# Patient Record
Sex: MALE | Race: WHITE | NOT HISPANIC OR LATINO | Employment: FULL TIME | URBAN - METROPOLITAN AREA
[De-identification: names, ages, dates, MRNs, and addresses within clinical notes are randomized per-mention and may not be internally consistent; named-entity substitution may affect disease eponyms.]

---

## 2022-01-07 ENCOUNTER — LAB VISIT (OUTPATIENT)
Dept: PRIMARY CARE CLINIC | Facility: CLINIC | Age: 22
End: 2022-01-07
Payer: OTHER GOVERNMENT

## 2022-01-07 DIAGNOSIS — Z20.822 CONTACT WITH AND (SUSPECTED) EXPOSURE TO COVID-19: ICD-10-CM

## 2022-01-07 LAB
CTP QC/QA: YES
SARS-COV-2 AG RESP QL IA.RAPID: NEGATIVE

## 2022-01-07 PROCEDURE — 87811 SARS-COV-2 COVID19 W/OPTIC: CPT

## 2022-03-18 ENCOUNTER — CLINICAL SUPPORT (OUTPATIENT)
Dept: OTHER | Facility: CLINIC | Age: 22
End: 2022-03-18
Payer: OTHER GOVERNMENT

## 2022-03-18 DIAGNOSIS — Z00.8 ENCOUNTER FOR OTHER GENERAL EXAMINATION: ICD-10-CM

## 2022-03-19 LAB
GLUCOSE SERPL-MCNC: 109 MG/DL (ref 60–140)
HDLC SERPL-MCNC: 33 MG/DL
POC CHOLESTEROL, LDL (DOCK): 82.6 MG/DL
POC CHOLESTEROL, TOTAL: 137 MG/DL
TRIGL SERPL-MCNC: 107 MG/DL

## 2022-07-24 ENCOUNTER — OFFICE VISIT (OUTPATIENT)
Dept: URGENT CARE | Facility: CLINIC | Age: 22
End: 2022-07-24
Payer: COMMERCIAL

## 2022-07-24 VITALS
OXYGEN SATURATION: 96 % | BODY MASS INDEX: 25.99 KG/M2 | HEART RATE: 74 BPM | TEMPERATURE: 98 F | WEIGHT: 176 LBS | SYSTOLIC BLOOD PRESSURE: 141 MMHG | RESPIRATION RATE: 19 BRPM | DIASTOLIC BLOOD PRESSURE: 98 MMHG

## 2022-07-24 DIAGNOSIS — U07.1 COVID: ICD-10-CM

## 2022-07-24 DIAGNOSIS — R05.9 COUGH: Primary | ICD-10-CM

## 2022-07-24 LAB
CTP QC/QA: YES
SARS-COV-2 RDRP RESP QL NAA+PROBE: POSITIVE

## 2022-07-24 PROCEDURE — 3080F DIAST BP >= 90 MM HG: CPT | Mod: CPTII,S$GLB,, | Performed by: PHYSICIAN ASSISTANT

## 2022-07-24 PROCEDURE — 3008F PR BODY MASS INDEX (BMI) DOCUMENTED: ICD-10-PCS | Mod: CPTII,S$GLB,, | Performed by: PHYSICIAN ASSISTANT

## 2022-07-24 PROCEDURE — 3077F PR MOST RECENT SYSTOLIC BLOOD PRESSURE >= 140 MM HG: ICD-10-PCS | Mod: CPTII,S$GLB,, | Performed by: PHYSICIAN ASSISTANT

## 2022-07-24 PROCEDURE — 1160F PR REVIEW ALL MEDS BY PRESCRIBER/CLIN PHARMACIST DOCUMENTED: ICD-10-PCS | Mod: CPTII,S$GLB,, | Performed by: PHYSICIAN ASSISTANT

## 2022-07-24 PROCEDURE — U0002: ICD-10-PCS | Mod: QW,S$GLB,, | Performed by: PHYSICIAN ASSISTANT

## 2022-07-24 PROCEDURE — 1160F RVW MEDS BY RX/DR IN RCRD: CPT | Mod: CPTII,S$GLB,, | Performed by: PHYSICIAN ASSISTANT

## 2022-07-24 PROCEDURE — 3008F BODY MASS INDEX DOCD: CPT | Mod: CPTII,S$GLB,, | Performed by: PHYSICIAN ASSISTANT

## 2022-07-24 PROCEDURE — 1159F PR MEDICATION LIST DOCUMENTED IN MEDICAL RECORD: ICD-10-PCS | Mod: CPTII,S$GLB,, | Performed by: PHYSICIAN ASSISTANT

## 2022-07-24 PROCEDURE — 99203 PR OFFICE/OUTPT VISIT, NEW, LEVL III, 30-44 MIN: ICD-10-PCS | Mod: S$GLB,,, | Performed by: PHYSICIAN ASSISTANT

## 2022-07-24 PROCEDURE — 3077F SYST BP >= 140 MM HG: CPT | Mod: CPTII,S$GLB,, | Performed by: PHYSICIAN ASSISTANT

## 2022-07-24 PROCEDURE — 1159F MED LIST DOCD IN RCRD: CPT | Mod: CPTII,S$GLB,, | Performed by: PHYSICIAN ASSISTANT

## 2022-07-24 PROCEDURE — U0002 COVID-19 LAB TEST NON-CDC: HCPCS | Mod: QW,S$GLB,, | Performed by: PHYSICIAN ASSISTANT

## 2022-07-24 PROCEDURE — 3080F PR MOST RECENT DIASTOLIC BLOOD PRESSURE >= 90 MM HG: ICD-10-PCS | Mod: CPTII,S$GLB,, | Performed by: PHYSICIAN ASSISTANT

## 2022-07-24 PROCEDURE — 99203 OFFICE O/P NEW LOW 30 MIN: CPT | Mod: S$GLB,,, | Performed by: PHYSICIAN ASSISTANT

## 2022-07-24 RX ORDER — METOPROLOL SUCCINATE 25 MG/1
25 TABLET, EXTENDED RELEASE ORAL DAILY
COMMUNITY

## 2022-07-24 NOTE — PROGRESS NOTES
Subjective:       Patient ID: Macho Davis is a 21 y.o. male.    Vitals:  weight is 79.8 kg (176 lb). His temperature is 98.3 °F (36.8 °C). His blood pressure is 141/98 (abnormal) and his pulse is 74. His respiration is 19 and oxygen saturation is 96%.     Chief Complaint: URI    Pt is complaining of covid symptom that started Friday.  PATIENT COMPLAINED OF SORE THROAT CONGESTION FATIGUE AND MALAISE.    URI   This is a new problem. The current episode started in the past 7 days. There has been no fever. Associated symptoms include congestion, coughing, headaches and a sore throat. Treatments tried: vitamin c. The treatment provided no relief.       HENT: Positive for congestion and sore throat.    Respiratory: Positive for cough.    Musculoskeletal: Positive for muscle ache.   Skin: Negative for erythema.   Neurological: Positive for headaches.       Objective:      Physical Exam   Constitutional: He is oriented to person, place, and time. He appears well-developed. He is cooperative.  Non-toxic appearance. He does not appear ill. No distress.   HENT:   Head: Normocephalic and atraumatic.   Ears:   Right Ear: Hearing, tympanic membrane, external ear and ear canal normal.   Left Ear: Hearing, tympanic membrane, external ear and ear canal normal.   Nose: Nose normal. No mucosal edema, rhinorrhea or nasal deformity. No epistaxis. Right sinus exhibits no maxillary sinus tenderness and no frontal sinus tenderness. Left sinus exhibits no maxillary sinus tenderness and no frontal sinus tenderness.   Mouth/Throat: Uvula is midline and mucous membranes are normal. No trismus in the jaw. Normal dentition. No uvula swelling. Posterior oropharyngeal erythema present. No oropharyngeal exudate or posterior oropharyngeal edema.      Comments: Posterior pharynx beefy red with some raised areas.  Eyes: Conjunctivae, EOM and lids are normal. Pupils are equal, round, and reactive to light. Right eye exhibits no discharge. Left eye  exhibits no discharge.   Neck: Trachea normal and phonation normal. Neck supple. No JVD present. No tracheal deviation present. No thyromegaly present. No edema present. No erythema present. No neck rigidity present.   Cardiovascular: Normal rate, regular rhythm, normal heart sounds and normal pulses.   No murmur heard.Exam reveals no gallop and no friction rub.   Pulmonary/Chest: Effort normal and breath sounds normal. No stridor. No respiratory distress. He has no decreased breath sounds. He has no wheezes. He has no rhonchi. He has no rales. He exhibits no tenderness.   Abdominal: Normal appearance. He exhibits no distension. Soft. There is no abdominal tenderness. There is no rebound and no guarding.   Musculoskeletal: Normal range of motion.         General: No deformity. Normal range of motion.   Neurological: He is alert and oriented to person, place, and time. He exhibits normal muscle tone. Coordination normal.   Skin: Skin is warm, dry, intact, not diaphoretic, not pale and no rash. Capillary refill takes less than 2 seconds. No erythema   Psychiatric: His speech is normal and behavior is normal. Judgment and thought content normal.   Nursing note and vitals reviewed.    Results for orders placed or performed in visit on 07/24/22   POCT COVID-19 Rapid Screening   Result Value Ref Range    POC Rapid COVID Positive (A) Negative     Acceptable Yes     No results found.       Assessment:       1. Cough    2. COVID          Plan:         Cough  -     POCT COVID-19 Rapid Screening    COVID    Follow up if symptoms worsen or fail to improve, for F/U with PCP or ED.   Patient Instructions   Over-the-counter medications as needed or directed for relief of symptoms.    VITAMIN-D 5000 IU USE DAILY  ZINC 50 MG DAILY  MELATONIN 3-5 MG DAILY  VITAMIN-C 1000 MG DAILY  MULTI VITAMIN DAILY  PEPCID 20 MG A.M. AND P.M.

## 2022-07-24 NOTE — PATIENT INSTRUCTIONS
Over-the-counter medications as needed or directed for relief of symptoms.    VITAMIN-D 5000 IU USE DAILY  ZINC 50 MG DAILY  MELATONIN 3-5 MG DAILY  VITAMIN-C 1000 MG DAILY  MULTI VITAMIN DAILY  PEPCID 20 MG A.M. AND P.M.

## 2022-12-12 ENCOUNTER — OFFICE VISIT (OUTPATIENT)
Dept: URGENT CARE | Facility: CLINIC | Age: 22
End: 2022-12-12
Payer: COMMERCIAL

## 2022-12-12 VITALS
OXYGEN SATURATION: 98 % | TEMPERATURE: 98 F | HEIGHT: 69 IN | WEIGHT: 230 LBS | HEART RATE: 68 BPM | SYSTOLIC BLOOD PRESSURE: 120 MMHG | DIASTOLIC BLOOD PRESSURE: 84 MMHG | BODY MASS INDEX: 34.07 KG/M2
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
## 2022-12-12 DIAGNOSIS — S61.212A LACERATION OF RIGHT MIDDLE FINGER WITHOUT FOREIGN BODY WITHOUT DAMAGE TO NAIL, INITIAL ENCOUNTER: ICD-10-CM

## 2022-12-12 DIAGNOSIS — Z02.6 ENCOUNTER RELATED TO WORKER'S COMPENSATION CLAIM: ICD-10-CM

## 2022-12-12 DIAGNOSIS — S61.210A LACERATION OF RIGHT INDEX FINGER WITHOUT FOREIGN BODY WITHOUT DAMAGE TO NAIL, INITIAL ENCOUNTER: Primary | ICD-10-CM

## 2022-12-12 PROCEDURE — 99203 PR OFFICE/OUTPT VISIT, NEW, LEVL III, 30-44 MIN: ICD-10-PCS | Mod: 25,S$GLB,, | Performed by: PHYSICIAN ASSISTANT

## 2022-12-12 PROCEDURE — 12001 RPR S/N/AX/GEN/TRNK 2.5CM/<: CPT | Mod: S$GLB,,, | Performed by: PHYSICIAN ASSISTANT

## 2022-12-12 PROCEDURE — 99203 OFFICE O/P NEW LOW 30 MIN: CPT | Mod: 25,S$GLB,, | Performed by: PHYSICIAN ASSISTANT

## 2022-12-12 PROCEDURE — 12001 LACERATION REPAIR: ICD-10-PCS | Mod: S$GLB,,, | Performed by: PHYSICIAN ASSISTANT

## 2022-12-12 RX ORDER — HYDROCODONE BITARTRATE AND ACETAMINOPHEN 5; 325 MG/1; MG/1
1 TABLET ORAL EVERY 6 HOURS PRN
Qty: 10 TABLET | Refills: 0 | Status: SHIPPED | OUTPATIENT
Start: 2022-12-12

## 2022-12-12 RX ORDER — DOXYCYCLINE HYCLATE 100 MG
100 TABLET ORAL 2 TIMES DAILY
Qty: 14 TABLET | Refills: 0 | Status: SHIPPED | OUTPATIENT
Start: 2022-12-12

## 2022-12-12 NOTE — LETTER
Urgent Care - [address] [address]  Phone: [phone]  Fax: [phone]  [facility]: [id number]    Pt Name: Macho Davis  Injury Date: 12/12/2022   Employee ID: [id number] Date of First Treatment: 12/12/2022   Company: [employer] ([employer])      Appointment Time: 10:25 AM Arrived: 10:30 AM   Provider: Philippe Garza PA-C Time Out: 12:40 PM     Office Treatment:   1. Laceration of right index finger without foreign body without damage to nail, initial encounter    2. Encounter related to worker's compensation claim    3. Laceration of right middle finger without foreign body without damage to nail, initial encounter    4. Work related injury      Medications Ordered This Encounter   Medications    doxycycline (VIBRA-TABS) 100 MG tablet    HYDROcodone-acetaminophen (NORCO) 5-325 mg per tablet      Patient Instructions: Attention not to aggravate affected area, Change bandage only when wet or dirty, Do not apply ointments or creams unless directed      Restrictions: Limited use of right hand and arm (Keep wounds covered, clean and dry)     Return Appointment: 12/14/2022 at 10:20 AM    shankar

## 2022-12-12 NOTE — PROCEDURES
Laceration Repair    Date/Time: 12/12/2022 10:40 AM  Performed by: Philippe Garza PA-C  Authorized by: Philippe Garza PA-C   Body area: upper extremity  Location details: right index finger  Laceration length: 1 cm  Foreign bodies: no foreign bodies  Tendon involvement: none  Nerve involvement: none  Vascular damage: no  Anesthesia: digital block    Anesthesia:  Local Anesthetic: lidocaine 1% without epinephrine  Anesthetic total: 6 mL  Preparation: Patient was prepped and draped in the usual sterile fashion.  Irrigation solution: tap water  Irrigation method: tap  Amount of cleaning: standard  Skin closure: 4-0 Prolene  Number of sutures: 1  Technique: simple  Approximation: close  Approximation difficulty: simple  Dressing: non-stick sterile dressing  Patient tolerance: Patient tolerated the procedure well with no immediate complications    Right middle finger laceration was repaired as well.  4 mL of lidocaine were used to perform a digital block.  4-0 Prolene suture was used.  For simple interrupted stitches were placed to close the wound.  Patient had no immediate complications.  Wound was dressed.

## 2022-12-12 NOTE — PROGRESS NOTES
Subjective:       Patient ID: Macho Davis is a 21 y.o. male.    Chief Complaint: Hand Injury (R Hand/R Fingers Middle/Index Finger laceration)    Patient's place of employment - [employer]   Patient's job title - PC Tech  Date of injury - 12/12/2022  Body part injured including left or right - R Index Finger, R Middle Finger  Injury Mechanism - Knife cutting cable   What they were doing when they got hurt - Working  What they did immediately after - [facility]  Pain scale right now - 7/8    Ksd    21-year-old left-hand dominant male presents with right index and middle finger lacerations after trying to cut a zip tie.  Says the knife slipped and cut his fingers.  Reports his tetanus is up-to-date. MEB    Hand Injury   Pertinent negatives include no chest pain or numbness.   Constitution: Negative for fever.   HENT:  Negative for facial trauma.    Neck: Negative for neck pain.   Cardiovascular:  Negative for chest trauma and chest pain.   Respiratory:  Negative for shortness of breath.    Gastrointestinal:  Negative for nausea and vomiting.   Musculoskeletal:  Positive for pain and trauma.   Skin:  Positive for laceration.   Allergic/Immunologic: Positive for immunizations up-to-date.   Neurological:  Negative for dizziness, numbness and tingling.      Objective:      Physical Exam  Vitals and nursing note reviewed.   Constitutional:       General: He is not in acute distress.     Appearance: He is well-developed. He is not diaphoretic.   HENT:      Head: Normocephalic and atraumatic.      Right Ear: Hearing and external ear normal.      Left Ear: Hearing and external ear normal.      Nose: Nose normal. No nasal deformity.   Eyes:      General: Lids are normal. No scleral icterus.     Conjunctiva/sclera: Conjunctivae normal.   Neck:      Trachea: Trachea normal.   Cardiovascular:      Pulses: Normal pulses.   Pulmonary:      Effort: Pulmonary effort is normal. No respiratory distress.      Breath sounds: No  stridor.   Musculoskeletal:      Right hand: Laceration present.      Left hand: Normal.        Hands:       Cervical back: Normal range of motion.   Skin:     General: Skin is warm and dry.      Capillary Refill: Capillary refill takes less than 2 seconds.   Neurological:      Mental Status: He is alert. He is not disoriented.      GCS: GCS eye subscore is 4. GCS verbal subscore is 5. GCS motor subscore is 6.      Sensory: No sensory deficit.   Psychiatric:         Attention and Perception: He is attentive.         Speech: Speech normal.         Behavior: Behavior normal.       Assessment:       1. Laceration of right index finger without foreign body without damage to nail, initial encounter    2. Encounter related to worker's compensation claim    3. Laceration of right middle finger without foreign body without damage to nail, initial encounter    4. Work related injury          Plan:       Laceration Repair    Date/Time: 12/12/2022 10:40 AM  Performed by: Philippe Garza PA-C  Authorized by: Philippe Garza PA-C   Body area: upper extremity  Location details: right index finger  Laceration length: 1 cm  Foreign bodies: no foreign bodies  Tendon involvement: none  Nerve involvement: none  Vascular damage: no  Anesthesia: digital block    Anesthesia:  Local Anesthetic: lidocaine 1% without epinephrine  Anesthetic total: 6 mL  Preparation: Patient was prepped and draped in the usual sterile fashion.  Irrigation solution: tap water  Irrigation method: tap  Amount of cleaning: standard  Skin closure: 4-0 Prolene  Number of sutures: 1  Technique: simple  Approximation: close  Approximation difficulty: simple  Dressing: non-stick sterile dressing  Patient tolerance: Patient tolerated the procedure well with no immediate complications    Right middle finger laceration was repaired as well.  4 mL of lidocaine were used to perform a digital block.  4-0 Prolene suture was used.  For simple interrupted stitches were  placed to close the wound.  Patient had no immediate complications.  Wound was dressed.         Medications Ordered This Encounter   Medications    doxycycline (VIBRA-TABS) 100 MG tablet     Sig: Take 1 tablet (100 mg total) by mouth 2 (two) times daily.     Dispense:  14 tablet     Refill:  0    HYDROcodone-acetaminophen (NORCO) 5-325 mg per tablet     Sig: Take 1 tablet by mouth every 6 (six) hours as needed for Pain (Take off duty only.).     Dispense:  10 tablet     Refill:  0     Quantity prescribed more than 7 day supply? No     Order Specific Question:   I have reviewed the Prescription Drug Monitoring Program (PDMP) database for this patient prior to prescribing the above opioid medication     Answer:   Yes     Patient Instructions: Attention not to aggravate affected area, Change bandage only when wet or dirty, Do not apply ointments or creams unless directed   Restrictions: Limited use of right hand and arm (Keep wounds covered, clean and dry)  Follow up in about 2 days (around 12/14/2022) for Wound Check.

## 2022-12-14 ENCOUNTER — OFFICE VISIT (OUTPATIENT)
Dept: URGENT CARE | Facility: CLINIC | Age: 22
End: 2022-12-14
Payer: COMMERCIAL

## 2022-12-14 ENCOUNTER — TELEPHONE (OUTPATIENT)
Dept: URGENT CARE | Facility: CLINIC | Age: 22
End: 2022-12-14
Payer: COMMERCIAL

## 2022-12-14 VITALS
TEMPERATURE: 99 F | SYSTOLIC BLOOD PRESSURE: 136 MMHG | DIASTOLIC BLOOD PRESSURE: 98 MMHG | OXYGEN SATURATION: 98 % | HEART RATE: 75 BPM

## 2022-12-14 DIAGNOSIS — S61.212D LACERATION OF RIGHT MIDDLE FINGER WITHOUT FOREIGN BODY WITHOUT DAMAGE TO NAIL, SUBSEQUENT ENCOUNTER: ICD-10-CM

## 2022-12-14 DIAGNOSIS — Z02.6 ENCOUNTER RELATED TO WORKER'S COMPENSATION CLAIM: Primary | ICD-10-CM

## 2022-12-14 DIAGNOSIS — S61.210D LACERATION OF RIGHT INDEX FINGER WITHOUT FOREIGN BODY WITHOUT DAMAGE TO NAIL, SUBSEQUENT ENCOUNTER: ICD-10-CM

## 2022-12-14 PROCEDURE — 99213 PR OFFICE/OUTPT VISIT, EST, LEVL III, 20-29 MIN: ICD-10-PCS | Mod: S$GLB,,, | Performed by: NURSE PRACTITIONER

## 2022-12-14 PROCEDURE — 99213 OFFICE O/P EST LOW 20 MIN: CPT | Mod: S$GLB,,, | Performed by: NURSE PRACTITIONER

## 2022-12-14 NOTE — LETTER
Urgent Care - [address] [address]  Phone: [phone]  Fax: [phone]  [facility]: [id number]    Pt Name: Macho Davis  Injury Date: 12/12/2022   Employee ID:  Date of First Treatment: 12/14/2022   Company: [employer] ([employer])      Appointment Time: 10:05 AM Arrived: 9:58 am   Provider: DORCAS Blackmon Time Out:  10:50 am     Office Treatment:   1. Encounter related to worker's compensation claim    2. Laceration of right index finger without foreign body without damage to nail, subsequent encounter    3. Laceration of right middle finger without foreign body without damage to nail, subsequent encounter          Patient Instructions: Attention not to aggravate affected area, Keep dressing clean/dry/covered    Restrictions: Regular Duty     Return Appointment: 12/21/2022 at 9:30 AM    shankar

## 2022-12-14 NOTE — PROGRESS NOTES
Subjective:       Patient ID: Macho Davis is a 21 y.o. male.    Chief Complaint: Hand Injury (R Hand Middle, Pointer finger  laceration/stitches)    Patient's place of employment - [employer] employee  Patient's job title - IT  Date of Injury - 12/12/2022  Body part injured - R [place]ena/Fingers. R Middle Finger 4 stitches, R Pointer Finger 1 stitch  Current work status per last visit - light duty  Improved, same, or worse - Improved with some pain     Ksd    Ambulatory f/u laceration to right middle and index fingers    Hand Injury     Constitution: Negative.   HENT: Negative.     Cardiovascular: Negative.    Eyes: Negative.    Respiratory: Negative.     Gastrointestinal: Negative.  Negative for bowel incontinence.   Endocrine: negative.   Genitourinary: Negative.  Negative for dysuria, flank pain, bladder incontinence and pelvic pain.   Musculoskeletal: Negative.  Positive for pain. Negative for abnormal ROM of joint and back pain.   Skin: Negative.  Positive for laceration.   Allergic/Immunologic: Negative.    Neurological: Negative.    Hematologic/Lymphatic: Negative.    Psychiatric/Behavioral: Negative.        Objective:      Physical Exam  Vitals and nursing note reviewed.   Constitutional:       Appearance: He is well-developed.   HENT:      Head: Normocephalic and atraumatic.      Right Ear: External ear normal.      Left Ear: External ear normal.      Nose: Nose normal.   Eyes:      Conjunctiva/sclera: Conjunctivae normal.      Pupils: Pupils are equal, round, and reactive to light.   Cardiovascular:      Rate and Rhythm: Normal rate and regular rhythm.      Heart sounds: Normal heart sounds.   Abdominal:      General: Bowel sounds are normal.      Palpations: Abdomen is soft.   Musculoskeletal:         General: Normal range of motion.      Cervical back: Normal range of motion and neck supple.   Skin:     General: Skin is warm and dry.      Capillary Refill: Capillary refill takes less than 2 seconds.           Neurological:      Mental Status: He is alert and oriented to person, place, and time.   Psychiatric:         Behavior: Behavior normal.         Thought Content: Thought content normal.         Judgment: Judgment normal.       Assessment:       1. Encounter related to worker's compensation claim    2. Laceration of right index finger without foreign body without damage to nail, subsequent encounter    3. Laceration of right middle finger without foreign body without damage to nail, subsequent encounter          Plan:            Patient Instructions: Attention not to aggravate affected area, Keep dressing clean/dry/covered   Restrictions: Regular Duty  Follow up in about 1 week (around 12/21/2022).

## 2022-12-14 NOTE — TELEPHONE ENCOUNTER
Called Macho to obtain WC/Company information relative to his work related injury dated 12.12.2022. 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 Macho provided me with his Employers Name and number.  I called Pastor Gamez with [employer] to obtain billing and WC Claim information. Spoke with Pastor, he chante be providing us with the correct information.Pastor Gamez [phone] email address [email or web address]

## 2022-12-14 NOTE — LETTER
Urgent Care - [address] [address]  Phone: [phone]  Fax: [phone]  [facility]: [id number]    Pt Name: Macho Davis  Injury Date: 12/12/2022   Employee ID:  Date of Treatment: 12/14/2022   Company: [employer] ([employer])      Appointment Time: 10:05 AM Arrived: 10;05 am   Provider: DORCAS Blackmon Time Out: 10:55 am     Office Treatment:   1. Encounter related to worker's compensation claim    2. Laceration of right index finger without foreign body without damage to nail, subsequent encounter    3. Laceration of right middle finger without foreign body without damage to nail, subsequent encounter          Patient Instructions: Attention not to aggravate affected area, Keep dressing clean/dry/covered      Restrictions: Regular Duty     Return Appointment: 12/21/2022 at 9:30 am    shankar

## 2022-12-21 ENCOUNTER — OFFICE VISIT (OUTPATIENT)
Dept: URGENT CARE | Facility: CLINIC | Age: 22
End: 2022-12-21
Payer: COMMERCIAL

## 2022-12-21 DIAGNOSIS — Z48.02 ENCOUNTER FOR ATTENTION TO SURGICAL DRESSINGS AND SUTURES: ICD-10-CM

## 2022-12-21 DIAGNOSIS — S61.210D LACERATION OF RIGHT INDEX FINGER WITHOUT FOREIGN BODY WITHOUT DAMAGE TO NAIL, SUBSEQUENT ENCOUNTER: Primary | ICD-10-CM

## 2022-12-21 DIAGNOSIS — Z48.01 ENCOUNTER FOR ATTENTION TO SURGICAL DRESSINGS AND SUTURES: ICD-10-CM

## 2022-12-21 DIAGNOSIS — Z02.6 ENCOUNTER RELATED TO WORKER'S COMPENSATION CLAIM: ICD-10-CM

## 2022-12-21 PROCEDURE — 99214 OFFICE O/P EST MOD 30 MIN: CPT | Mod: S$GLB,,, | Performed by: NURSE PRACTITIONER

## 2022-12-21 PROCEDURE — 99214 PR OFFICE/OUTPT VISIT, EST, LEVL IV, 30-39 MIN: ICD-10-PCS | Mod: S$GLB,,, | Performed by: NURSE PRACTITIONER

## 2022-12-21 PROCEDURE — 99024 POSTOP FOLLOW-UP VISIT: CPT | Mod: S$GLB,,, | Performed by: NURSE PRACTITIONER

## 2022-12-21 PROCEDURE — 99024 SUTURE REMOVAL: ICD-10-PCS | Mod: S$GLB,,, | Performed by: NURSE PRACTITIONER

## 2022-12-21 NOTE — PROGRESS NOTES
Subjective:       Patient ID: Macho Davis is a 22 y.o. male.    Chief Complaint: No chief complaint on file.    Patient's place of employment - [employer]  Patient's job title - IT  Date of Injury - 12/12/2022  Body part injured - right fingers  Current work status per last visit -   Improved, same, or worse -  improved      Here for follow up suture removal. Stil with some swelling worse on middle finger.               kw    Constitution: Negative.   HENT: Negative.     Cardiovascular: Negative.    Eyes: Negative.    Respiratory: Negative.     Gastrointestinal: Negative.  Negative for bowel incontinence.   Endocrine: negative.   Genitourinary: Negative.  Negative for dysuria, flank pain, bladder incontinence and pelvic pain.   Musculoskeletal:  Positive for joint swelling. Negative for pain, abnormal ROM of joint and back pain.   Skin: Negative.    Allergic/Immunologic: Negative.    Neurological: Negative.    Hematologic/Lymphatic: Negative.    Psychiatric/Behavioral: Negative.        Objective:      Physical Exam  Vitals and nursing note reviewed.   Constitutional:       Appearance: He is well-developed.   HENT:      Head: Normocephalic and atraumatic.      Right Ear: External ear normal.      Left Ear: External ear normal.      Nose: Nose normal.   Eyes:      Conjunctiva/sclera: Conjunctivae normal.      Pupils: Pupils are equal, round, and reactive to light.   Cardiovascular:      Rate and Rhythm: Normal rate and regular rhythm.      Heart sounds: Normal heart sounds.   Abdominal:      General: Bowel sounds are normal.      Palpations: Abdomen is soft.   Musculoskeletal:         General: Normal range of motion.        Arms:       Cervical back: Normal range of motion and neck supple.   Skin:     General: Skin is warm and dry.      Capillary Refill: Capillary refill takes less than 2 seconds.   Neurological:      Mental Status: He is alert and oriented to person, place, and time.   Psychiatric:         Behavior:  Behavior normal.         Thought Content: Thought content normal.         Judgment: Judgment normal.     Suture Removal    Date/Time: 12/21/2022 9:30 AM  Location procedure was performed: [facility]  Performed by: DORCAS Cantor  Authorized by: DORCAS Cantor   Body area: upper extremity  Location details: right long finger  Description of findings: mild edema   Wound Appearance: clean and well healed  Sutures Removed: 4  Post-removal: no dressing applied  Complications: No  Specimens: No  Implants: No  Patient tolerance: Patient tolerated the procedure well with no immediate complications  Comments: Wound 2 to right index finger sutures x 2 removed; no erythema, edema, normal rom , n/v intact       Assessment:       1. Encounter related to worker's compensation claim    2. Laceration of right index finger without foreign body without damage to nail, subsequent encounter          Plan:       Patient still with stiffness , swelling and pulling with slight deformity which I believe is from favoring . Advised rom exercises and will recheck one more time in one week to ensure resolution.      Patient Instructions: Attention not to aggravate affected area   Restrictions: Regular Duty  Follow up in about 1 week (around 12/28/2022).

## 2022-12-21 NOTE — LETTER
Urgent Care - [address] [address]  Phone: [phone]  Fax: [phone]  [facility]: [id number]    Pt Name: Macho Davis  Injury Date: 12/12/2022   Employee ID: [id number] Date of Treatment: 12/21/2022   Company: [employer] ([employer])      Appointment Time: 09:15 AM Arrived: 9:30AM   Provider: DORCAS Blackmon Time Out:11:02AM     Office Treatment:   1. Encounter related to worker's compensation claim    2. Laceration of right index finger without foreign body without damage to nail, subsequent encounter          Patient Instructions: Attention not to aggravate affected area    Restrictions: Regular Duty     Return Appointment: 12/28/2022 at 9:05AM        KW

## 2022-12-21 NOTE — PROCEDURES
Suture Removal    Date/Time: 12/21/2022 9:30 AM  Location procedure was performed: [facility]  Performed by: DORCAS Cantor  Authorized by: DORCAS Cantor   Body area: upper extremity  Location details: right long finger  Description of findings: mild edema   Wound Appearance: clean and well healed  Sutures Removed: 4  Post-removal: no dressing applied  Complications: No  Specimens: No  Implants: No  Patient tolerance: Patient tolerated the procedure well with no immediate complications  Comments: Wound 2 to right index finger sutures x 2 removed; no erythema, edema, normal rom , n/v intact

## 2022-12-28 ENCOUNTER — OFFICE VISIT (OUTPATIENT)
Dept: URGENT CARE | Facility: CLINIC | Age: 22
End: 2022-12-28
Payer: COMMERCIAL

## 2022-12-28 VITALS
TEMPERATURE: 98 F | HEART RATE: 71 BPM | SYSTOLIC BLOOD PRESSURE: 127 MMHG | DIASTOLIC BLOOD PRESSURE: 85 MMHG | OXYGEN SATURATION: 98 %

## 2022-12-28 DIAGNOSIS — Z02.6 ENCOUNTER RELATED TO WORKER'S COMPENSATION CLAIM: Primary | ICD-10-CM

## 2022-12-28 DIAGNOSIS — S61.210D LACERATION OF RIGHT INDEX FINGER WITHOUT FOREIGN BODY WITHOUT DAMAGE TO NAIL, SUBSEQUENT ENCOUNTER: ICD-10-CM

## 2022-12-28 DIAGNOSIS — S61.212D LACERATION OF RIGHT MIDDLE FINGER WITHOUT FOREIGN BODY WITHOUT DAMAGE TO NAIL, SUBSEQUENT ENCOUNTER: ICD-10-CM

## 2022-12-28 PROCEDURE — 99213 PR OFFICE/OUTPT VISIT, EST, LEVL III, 20-29 MIN: ICD-10-PCS | Mod: S$GLB,,, | Performed by: NURSE PRACTITIONER

## 2022-12-28 PROCEDURE — 99213 OFFICE O/P EST LOW 20 MIN: CPT | Mod: S$GLB,,, | Performed by: NURSE PRACTITIONER

## 2022-12-28 NOTE — LETTER
Urgent Care - [address] [address]  Phone: [phone]  Fax: [phone]  [facility]: [id number]    Pt Name: Macho Davis  Injury Date: 12/12/2022   Employee ID: [id number] Date of Treatment: 12/28/2022   Company: [employer] ([facility] [employer] [facility])      Appointment Time: 08:50 AM Arrived: 9:05 AM   Provider: DORCAS Blackmon Time Out: 10:00 AM     Office Treatment:   1. Encounter related to worker's compensation claim    2. Laceration of right index finger without foreign body without damage to nail, subsequent encounter    3. Laceration of right middle finger without foreign body without damage to nail, subsequent encounter          Patient Instructions: Attention not to aggravate affected area      Restrictions: Regular Duty, Discharged from Occupational Health     Return Appointment: Discharged from [facility]. Return if symptoms worsen     ksd

## 2022-12-28 NOTE — PROGRESS NOTES
Subjective:       Patient ID: Macho Davis is a 22 y.o. male.    Chief Complaint: Hand Injury (R Hand )    Patient's place of employment - [employer]  Patient's job title - IT   Date of Injury - 12/12/2022  Body part injured - R Hand/Finger  Current work status per last visit - Regular Duty  Improved, same, or worse -  Improved. Some joint pain    Ksd    Follow laceration to fingers. He states swelling improved and rom improved but still with some pain with gripping.     Hand Injury     Constitution: Negative.   HENT: Negative.     Cardiovascular: Negative.    Eyes: Negative.    Respiratory: Negative.     Gastrointestinal: Negative.  Negative for bowel incontinence.   Endocrine: negative.   Genitourinary: Negative.  Negative for dysuria, flank pain, bladder incontinence and pelvic pain.   Musculoskeletal:  Positive for joint pain. Negative for pain, abnormal ROM of joint and back pain.   Skin: Negative.    Allergic/Immunologic: Negative.    Neurological: Negative.    Hematologic/Lymphatic: Negative.    Psychiatric/Behavioral: Negative.        Objective:      Physical Exam  Vitals and nursing note reviewed.   Constitutional:       Appearance: He is well-developed.   HENT:      Head: Normocephalic and atraumatic.      Right Ear: External ear normal.      Left Ear: External ear normal.      Nose: Nose normal.   Eyes:      Conjunctiva/sclera: Conjunctivae normal.      Pupils: Pupils are equal, round, and reactive to light.   Cardiovascular:      Rate and Rhythm: Normal rate and regular rhythm.      Heart sounds: Normal heart sounds.   Abdominal:      General: Bowel sounds are normal.      Palpations: Abdomen is soft.   Musculoskeletal:         General: Normal range of motion.        Arms:       Cervical back: Normal range of motion and neck supple.   Skin:     General: Skin is warm and dry.      Capillary Refill: Capillary refill takes less than 2 seconds.   Neurological:      Mental Status: He is alert and  oriented to person, place, and time.   Psychiatric:         Behavior: Behavior normal.         Thought Content: Thought content normal.         Judgment: Judgment normal.       Assessment:       1. Encounter related to worker's compensation claim    2. Laceration of right index finger without foreign body without damage to nail, subsequent encounter    3. Laceration of right middle finger without foreign body without damage to nail, subsequent encounter          Plan:     Overall healing very nicely. I do not think at this point we need to continue to follow him but advised to follow up as needed in future for any persisting issues.        Patient Instructions: Attention not to aggravate affected area   Restrictions: Regular Duty, Discharged from Occupational Health  Follow up if symptoms worsen or fail to improve.

## 2025-06-16 NOTE — PROGRESS NOTES
Assessment:       1. Preventative health care    2. Need for vaccination    3. Screening for diabetes mellitus    4. Screening for hyperlipidemia    5. Screening for venereal disease    6. Encounter for hepatitis C screening test for low risk patient    7. Encounter for screening for HIV    8. Essential hypertension    9. Class 2 severe obesity due to excess calories with serious comorbidity and body mass index (BMI) of 35.0 to 35.9 in adult    10. Snoring    11. Irritable bowel syndrome, unspecified type              Plan:             Macho was seen today for establish care.    Diagnoses and all orders for this visit:    Preventative health care  -     Hepatitis C Antibody; Future  -     HIV 1/2 Ag/Ab (4th Gen); Future  -     Hemoglobin A1C; Future  -     Lipid Panel; Future  -     CBC Auto Differential; Future  -     Basic Metabolic Panel; Future  -     Hepatic Function Panel; Future  -     TSH; Future  -     Treponema Pallidium Antibodies IgG, IgM; Future  -     C. trachomatis/N. gonorrhoeae by AMP DNA [facility]; Urine; Future    Need for vaccination  -     Discontinue: Tdap vaccine injection 0.5 mL    Screening for diabetes mellitus  -     Hemoglobin A1C; Future    Screening for hyperlipidemia  -     Lipid Panel; Future    Screening for venereal disease  -     Treponema Pallidium Antibodies IgG, IgM; Future  -     C. trachomatis/N. gonorrhoeae by AMP DNA [facility]; Urine; Future    Encounter for hepatitis C screening test for low risk patient  -     Hepatitis C Antibody; Future    Encounter for screening for HIV  -     HIV 1/2 Ag/Ab (4th Gen); Future    Essential hypertension  -     Basic Metabolic Panel; Standing    Class 2 severe obesity due to excess calories with serious comorbidity and body mass index (BMI) of 35.0 to 35.9 in adult    Snoring  -     Home Sleep Study; Future    Irritable bowel syndrome, unspecified type              Assessment & Plan    IMPRESSION:  Assessed HTN management, considering  family history and young age.  Evaluated potential for reducing or discontinuing BP medication with lifestyle modifications.  Considered sleep apnea as potential contributor to HTN.    PATIENT EDUCATION:  - Explained DASH diet and its proven benefits for reducing BP.  - Discussed various approaches to healthy eating and weight management, including portion control, intermittent fasting, and reducing processed foods.  - Provided information on exercise programs and resources for fitness routines.    ACTION ITEMS/LIFESTYLE:  - Implement DASH diet to help lower BP.  - Incorporate both strength training and aerobic activity into exercise routine.  - Patient to check BP at home 2-3 times per week, on consistent days.  - Reduce salt intake and decrease consumption of red meats, cured meats, and processed foods.      ORDERS:  - Ordered home sleep study to evaluate for possible sleep apnea.      FOLLOW UP :  - follow with lab results  - 3 month evisit  - 6 month OV for HTN           Subjective:       Patient ID: Macho Davis is a 24 y.o. male.    Chief Complaint:   Establish Care      HPI    #Last visit/Previous Provider  This patient is new to me  Previously seen by No, Primary Doctor  Last visit was > 3yr  Last CPE was > 3yr      Link to History               #Concerns Today      #Chronic Problems          STOP-BANG  Snore: No  Tired/fatigue:Yes  Observed apnea; No  Pressure - HTN; Yes  BMI >35: Yes  Age >50; No  Neck circumference: Yes  Gender male; Yes  Score = 5           History of Present Illness    CHIEF COMPLAINT:  Patient presents today for an annual check-up    HYPERTENSION:  He takes valsartan 320 mg daily for hypertension. He initially experienced symptoms after receiving the COVID vaccine in 2020, including chest tightness and muscle soreness that persisted for a few months. Echocardiogram was normal.    IBS:  He has a history of IBS with normal gastroenterology workup. Symptoms initially developed after  frequent dining out while working in [place] for 6 months in 2023. He took generic IB Guard (peppermint oil) daily for several months with symptom improvement and reports normal bowel movements since discontinuing the medication with only occasional exacerbations.    DIET AND LIFESTYLE:  He reports an inconsistent diet, alternating between healthy meals and fast food. His beverages include diet/zero sugar drinks, ice tea, and coffee. He occasionally consumes desserts such as ice cream and brownies. He drinks alcohol 3-4 drinks every other weekend. He is currently not exercising much despite having exercise equipment at home.    FAMILY HISTORY:  He has a family history of hypertension. His mother was recently diagnosed with type 2 diabetes and had a breast tumor removed at age 19 with no recurrence. Grandfather had a heart attack with history of alcoholism.    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 He has no children.    SURGICAL HISTORY:  He had circumcision at birth and wisdom teeth removal a few years ago.      ROS:  General: +fatigue  Eyes: +vision changes         Problem List[1]          Health Maintenance Due   Topic Date Due    Hepatitis C Screening  Never done    HIV Screening  Never done    HPV Vaccines (1 - Male 3-dose series) Never done    TETANUS VACCINE  06/30/2021    COVID-19 Vaccine (5 - 2024-25 season) 09/01/2024       Health Maintenance Topics with due status: Not Due       Topic Last Completion Date    Influenza Vaccine Not Due    RSV Vaccine (Age 60+ and Pregnant patients) Not Due           Tobacco Use: Low Risk  (6/17/2025)    Patient History     Smoking Tobacco Use: Never     Smokeless Tobacco Use: Never     Passive Exposure: Not on file         No results found for this or any previous visit.      Review of Systems   Constitutional:  Negative for activity change, appetite change, chills, diaphoresis, fatigue, fever and unexpected weight change.   HENT:  Negative for  "congestion, postnasal drip, rhinorrhea and sore throat.    Eyes:  Negative for pain, redness and visual disturbance.   Respiratory:  Negative for cough, shortness of breath and wheezing.    Cardiovascular:  Negative for chest pain and palpitations.   Gastrointestinal:  Negative for abdominal distention, abdominal pain, blood in stool, constipation, diarrhea, nausea and vomiting.   Genitourinary:  Negative for dysuria.   Neurological:  Negative for weakness and numbness.   Psychiatric/Behavioral:  Negative for sleep disturbance.    All other systems reviewed and are negative.        Objective:   /82 (BP Location: Right arm, Patient Position: Sitting)   Pulse 75   Ht 5' 9" (1.753 m)   Wt 110.4 kg (243 lb 6.2 oz)   SpO2 98%   BMI 35.94 kg/m²         6/17/2025     8:20 AM 12/12/2022    10:47 AM 7/24/2022    12:48 PM 3/18/2022    10:14 AM 8/28/2014     5:51 PM   Vitals   Height 5' 9" (1.753 m) 5' 9" (1.753 m)  5' 9" (1.753 m)    Weight (lbs) 243.39 230 176  176   BMI (kg/m2) 35.9 33.9               Physical Exam  Vitals and nursing note reviewed.   Constitutional:       General: He is not in acute distress.     Appearance: He is well-developed. He is not ill-appearing, toxic-appearing or diaphoretic.   HENT:      Head: Normocephalic.   Eyes:      Conjunctiva/sclera: Conjunctivae normal.   Cardiovascular:      Rate and Rhythm: Normal rate and regular rhythm.      Heart sounds: Normal heart sounds. No murmur heard.     No friction rub. No gallop.   Pulmonary:      Effort: Pulmonary effort is normal. No respiratory distress.   Abdominal:      General: There is no distension.      Palpations: Abdomen is soft.   Neurological:      General: No focal deficit present.      Mental Status: He is alert and oriented to person, place, and time.             ASCVD  The ASCVD Risk score (Dori DK, et al., 2019) failed to calculate for the following reasons:    The 2019 ASCVD risk score is only valid for ages 40 to " "79    Basic Labs  BMP  Lab Results   Component Value Date     05/31/2024    K 4.3 05/31/2024    CO2 29 05/31/2024    BUN 14 05/31/2024    CREATININE 1.03 05/31/2024    CALCIUM 10.1 05/31/2024     Lab Results   Component Value Date    ALT 28 05/31/2024    AST 24 05/31/2024    ALKPHOS 60 05/31/2024    BILITOT 0.9 05/31/2024       No results found for: "TSH"    No results found for: "WBC", "HGB", "HCT", "MCV", "PLT"        STD  No results found for: "HIV1X2", "BOZ29FASX"  No results found for: "RPR"  No results found for: "HAV", "HEPAIGM", "HEPBIGM", "HEPBCAB", "HBEAG", "HEPCAB"  No results found for: "LABNGO", "LABCHLA"      Lipids  No results found for: "CHOL"  No results found for: "HDL"  No results found for: "LDLCALC"  No results found for: "TRIG"  No results found for: "CHOLHDL"    DM  Lab Results   Component Value Date    HGBA1C 5.0 05/31/2024             Future Appointments   Date Time Provider Department Center   12/17/2025  8:15 AM LAB, [facility] [place]           Medication List with Changes/Refills   Current Medications    VALSARTAN (DIOVAN) 320 MG TABLET    Take 320 mg by mouth.   Discontinued Medications    DOXYCYCLINE (VIBRA-TABS) 100 MG TABLET    Take 1 tablet (100 mg total) by mouth 2 (two) times daily.    HYDROCODONE-ACETAMINOPHEN (NORCO) 5-325 MG PER TABLET    Take 1 tablet by mouth every 6 (six) hours as needed for Pain (Take off duty only.).    METOPROLOL SUCCINATE (TOPROL-XL) 25 MG 24 HR TABLET    Take 25 mg by mouth once daily.       Disclaimer:  This note has been generated using voice-recognition software. There may be grammatical or spelling errors that have been missed during proof-reading     This note was generated with the assistance of ambient listening technology. Verbal consent was obtained by the patient and accompanying visitor(s) for the recording of patient appointment to facilitate this note. I attest to having reviewed and edited the generated note for accuracy, " though some syntax or spelling errors may persist. Please contact the author of this note for any clarification.         [1]   Patient Active Problem List  Diagnosis    Essential hypertension    Class 2 severe obesity due to excess calories with serious comorbidity and body mass index (BMI) of 35.0 to 35.9 in adult    Snoring    Irritable bowel syndrome

## 2025-06-17 ENCOUNTER — APPOINTMENT (OUTPATIENT)
Dept: LAB | Facility: HOSPITAL | Age: 25
End: 2025-06-17
Attending: INTERNAL MEDICINE
Payer: COMMERCIAL

## 2025-06-17 ENCOUNTER — OFFICE VISIT (OUTPATIENT)
Dept: FAMILY MEDICINE | Facility: CLINIC | Age: 25
End: 2025-06-17
Payer: COMMERCIAL

## 2025-06-17 VITALS
BODY MASS INDEX: 36.05 KG/M2 | HEIGHT: 69 IN | WEIGHT: 243.38 LBS | DIASTOLIC BLOOD PRESSURE: 82 MMHG | HEART RATE: 75 BPM | SYSTOLIC BLOOD PRESSURE: 130 MMHG | OXYGEN SATURATION: 98 %

## 2025-06-17 DIAGNOSIS — Z11.59 ENCOUNTER FOR HEPATITIS C SCREENING TEST FOR LOW RISK PATIENT: ICD-10-CM

## 2025-06-17 DIAGNOSIS — Z11.3 SCREENING FOR VENEREAL DISEASE: ICD-10-CM

## 2025-06-17 DIAGNOSIS — Z13.1 SCREENING FOR DIABETES MELLITUS: ICD-10-CM

## 2025-06-17 DIAGNOSIS — E66.812 CLASS 2 SEVERE OBESITY DUE TO EXCESS CALORIES WITH SERIOUS COMORBIDITY AND BODY MASS INDEX (BMI) OF 35.0 TO 35.9 IN ADULT: ICD-10-CM

## 2025-06-17 DIAGNOSIS — Z11.4 ENCOUNTER FOR SCREENING FOR HIV: ICD-10-CM

## 2025-06-17 DIAGNOSIS — I10 ESSENTIAL HYPERTENSION: ICD-10-CM

## 2025-06-17 DIAGNOSIS — Z13.220 SCREENING FOR HYPERLIPIDEMIA: ICD-10-CM

## 2025-06-17 DIAGNOSIS — E66.01 CLASS 2 SEVERE OBESITY DUE TO EXCESS CALORIES WITH SERIOUS COMORBIDITY AND BODY MASS INDEX (BMI) OF 35.0 TO 35.9 IN ADULT: ICD-10-CM

## 2025-06-17 DIAGNOSIS — Z23 NEED FOR VACCINATION: ICD-10-CM

## 2025-06-17 DIAGNOSIS — R06.83 SNORING: ICD-10-CM

## 2025-06-17 DIAGNOSIS — Z00.00 PREVENTATIVE HEALTH CARE: Primary | ICD-10-CM

## 2025-06-17 DIAGNOSIS — K58.9 IRRITABLE BOWEL SYNDROME, UNSPECIFIED TYPE: ICD-10-CM

## 2025-06-17 PROCEDURE — 99385 PREV VISIT NEW AGE 18-39: CPT | Mod: 25,S$GLB,, | Performed by: INTERNAL MEDICINE

## 2025-06-17 PROCEDURE — 3075F SYST BP GE 130 - 139MM HG: CPT | Mod: CPTII,S$GLB,, | Performed by: INTERNAL MEDICINE

## 2025-06-17 PROCEDURE — 1160F RVW MEDS BY RX/DR IN RCRD: CPT | Mod: CPTII,S$GLB,, | Performed by: INTERNAL MEDICINE

## 2025-06-17 PROCEDURE — 99999 PR PBB SHADOW E&M-EST. PATIENT-LVL III: CPT | Mod: PBBFAC,,, | Performed by: INTERNAL MEDICINE

## 2025-06-17 PROCEDURE — 3079F DIAST BP 80-89 MM HG: CPT | Mod: CPTII,S$GLB,, | Performed by: INTERNAL MEDICINE

## 2025-06-17 PROCEDURE — 1159F MED LIST DOCD IN RCRD: CPT | Mod: CPTII,S$GLB,, | Performed by: INTERNAL MEDICINE

## 2025-06-17 PROCEDURE — 3008F BODY MASS INDEX DOCD: CPT | Mod: CPTII,S$GLB,, | Performed by: INTERNAL MEDICINE

## 2025-06-17 PROCEDURE — 4010F ACE/ARB THERAPY RXD/TAKEN: CPT | Mod: CPTII,S$GLB,, | Performed by: INTERNAL MEDICINE

## 2025-06-17 RX ORDER — VALSARTAN 320 MG/1
320 TABLET ORAL
COMMUNITY
Start: 2025-05-16

## 2025-06-17 NOTE — PATIENT INSTRUCTIONS
Weight control  For Diet   - Try the DASH and/or the Mediterranean Diet  - DASH- https://www.nhlbi.nih.gov/health-topics/dash-eating-plan  - Try meeting with a Nutritionist  - https://www.Bubbles and Beyond.Transmetrics/us/nutritionists-dietitians/la/[facility] .     For Food delivery program try  - Hungry Bellwood  - https://eWings.com/  - Purple carrot - https://www.Health eVillages/    Try a portion control plate  https://DistalMotion.org/portion-control-plates-that-get-results/    For Exercise  Start with at least 20 min a day of moderate intensity exercise  This can be done at your gym or at home  Some home workout beginner plans include   - https://Sellvana.Transmetrics/  - https://yogaPostRankriXiant.Transmetrics    For medications  - please look up the following medication to check for your insurance ozempic, trulicity, mounjaro, Wegovy, rybelsus, Contrave, Qsymia     For applications  - Myplate.com - https://www.myplate.gov/   - myfitnesspal - https://www.myfitnesspal.com/  - Noom - https://www.noom.com/  - Products and Programs  optavia

## 2025-06-20 ENCOUNTER — RESULTS FOLLOW-UP (OUTPATIENT)
Dept: FAMILY MEDICINE | Facility: CLINIC | Age: 25
End: 2025-06-20

## 2025-06-23 RX ORDER — VALSARTAN 320 MG/1
320 TABLET ORAL DAILY
Qty: 90 TABLET | Refills: 1 | Status: SHIPPED | OUTPATIENT
Start: 2025-06-23

## 2025-06-23 NOTE — TELEPHONE ENCOUNTER
No care due was identified.  [facility] Embedded Care Due Messages. Reference number: [id number].   6/23/2025 10:52:34 AM CDT

## 2025-06-23 NOTE — TELEPHONE ENCOUNTER
Copied from CRM #[id number]. Topic: Medications - Medication Refill  >> Jun 23, 2025  9:34 AM Alicia wrote:  Call the clinic reply in [facility]: Y       Please refill the medication(s) listed below. Please call the patient when the prescription(s) is ready for  at this phone number.[phone] pt is almost of the medication just one pill left.Please contact to further discuss and advise.        Medication #1:valsartan (DIOVAN) 320 MG tablet    Medication #2:    Preferred Pharmacy:  .  [id number] - [place] - [address] AT [facility] [address]  [address] [address]  Phone: [phone] Fax: [phone]

## 2025-06-30 ENCOUNTER — TELEPHONE (OUTPATIENT)
Dept: SLEEP MEDICINE | Facility: OTHER | Age: 25
End: 2025-06-30
Payer: COMMERCIAL

## 2025-08-20 ENCOUNTER — TELEPHONE (OUTPATIENT)
Dept: SLEEP MEDICINE | Facility: OTHER | Age: 25
End: 2025-08-20
Payer: COMMERCIAL